# Patient Record
Sex: FEMALE | Race: WHITE | NOT HISPANIC OR LATINO | Employment: FULL TIME | URBAN - METROPOLITAN AREA
[De-identification: names, ages, dates, MRNs, and addresses within clinical notes are randomized per-mention and may not be internally consistent; named-entity substitution may affect disease eponyms.]

---

## 2023-11-02 DIAGNOSIS — D33.3 VESTIBULAR SCHWANNOMA (MULTI): Primary | ICD-10-CM

## 2024-01-11 ENCOUNTER — OFFICE VISIT (OUTPATIENT)
Dept: OTOLARYNGOLOGY | Facility: CLINIC | Age: 64
End: 2024-01-11
Payer: COMMERCIAL

## 2024-01-11 ENCOUNTER — CLINICAL SUPPORT (OUTPATIENT)
Dept: AUDIOLOGY | Facility: CLINIC | Age: 64
End: 2024-01-11
Payer: COMMERCIAL

## 2024-01-11 VITALS — BODY MASS INDEX: 24.22 KG/M2 | WEIGHT: 132.4 LBS

## 2024-01-11 DIAGNOSIS — D33.3 VESTIBULAR SCHWANNOMA (MULTI): ICD-10-CM

## 2024-01-11 DIAGNOSIS — H90.3 SENSORINEURAL HEARING LOSS, BILATERAL: Primary | ICD-10-CM

## 2024-01-11 DIAGNOSIS — H93.13 BILATERAL TINNITUS: ICD-10-CM

## 2024-01-11 DIAGNOSIS — H90.3 BILATERAL SENSORINEURAL HEARING LOSS: Primary | ICD-10-CM

## 2024-01-11 PROCEDURE — 99204 OFFICE O/P NEW MOD 45 MIN: CPT | Performed by: OTOLARYNGOLOGY

## 2024-01-11 PROCEDURE — 1036F TOBACCO NON-USER: CPT | Performed by: OTOLARYNGOLOGY

## 2024-01-11 PROCEDURE — 92557 COMPREHENSIVE HEARING TEST: CPT | Performed by: AUDIOLOGIST

## 2024-01-11 PROCEDURE — 92567 TYMPANOMETRY: CPT | Performed by: AUDIOLOGIST

## 2024-01-11 RX ORDER — METOPROLOL SUCCINATE 25 MG/1
12.5 TABLET, EXTENDED RELEASE ORAL
COMMUNITY
Start: 2023-11-04

## 2024-01-11 RX ORDER — ESTRADIOL 1 MG/1
1 TABLET ORAL DAILY
COMMUNITY
Start: 2023-12-19

## 2024-01-11 RX ORDER — LISINOPRIL 2.5 MG/1
2.5 TABLET ORAL
COMMUNITY
Start: 2023-11-04

## 2024-01-11 RX ORDER — METHOCARBAMOL 750 MG/1
750 TABLET, FILM COATED ORAL 4 TIMES DAILY PRN
COMMUNITY

## 2024-01-11 ASSESSMENT — PATIENT HEALTH QUESTIONNAIRE - PHQ9
2. FEELING DOWN, DEPRESSED OR HOPELESS: NOT AT ALL
1. LITTLE INTEREST OR PLEASURE IN DOING THINGS: NOT AT ALL
SUM OF ALL RESPONSES TO PHQ9 QUESTIONS 1 AND 2: 0

## 2024-01-11 NOTE — PROGRESS NOTES
Jaquelin Garcia is a 63 y.o. female is referred by Dr Hernandez for evaluation and management of a left  internal auditory canal and subjective bilateral tinnitus. She is accompanied by her .    When asked about ear pain, hearing loss, discharge from ear, tinnitus, imbalance or vertigo, the patient admits to progressively worsening tinnitus which is worse in the left than right side. She notes it disrupts her sleep and sometimes wears head phones with no relief.  When asked about a significant past otological history including history of prior ear surgery, noise exposure, exposure to ototoxic drugs or agents, and/or family history of hearing loss, the patient admits to none.    The patient's current medications, active allergies and list of medical problems were reviewed in the EHR and confirmed electronically there are as follows;  Allergies:   Not on File  Current list of medications:   No current outpatient medications on file.     No current facility-administered medications for this visit.     Problem list:  There is no problem list on file for this patient.    Medical history:  No past medical history on file.  Surgical history:  Past Surgical History:   Procedure Laterality Date    OTHER SURGICAL HISTORY  2022    Tonsillectomy    OTHER SURGICAL HISTORY  2022    Hysterectomy    OTHER SURGICAL HISTORY  2022     section    OTHER SURGICAL HISTORY  2022    Lumpectomy    OTHER SURGICAL HISTORY  2022    Lung surgery    OTHER SURGICAL HISTORY  2022    Oral surgery     Family history:  No family history on file.  Social history:       Physical Examination:  CONSTITUTIONAL:  No acute distress  VOICE:  No hoarseness or other abnormality  RESPIRATION:  Breathing comfortably, no stridor  CV:  No clubbing/cyanosis/edema in hands  EYES:  EOM intact, sclera clear  NEURO:  Alert and oriented times 3, Cranial nerves II-XII grossly intact and symmetric bilaterally  HEAD AND FACE:   Mal occlusion and bad dentition. No significant tenderness  RIGHT EAR:  Normal external ear and post auricular area, no visible lesions, external auditory canal patent, tympanic membrane intact, no retraction, no signs of mass, effusion, or infection within the middle ear  LEFT EAR: Normal external ear and post auricular area, no visible lesions, external auditory canal patent, tympanic membrane intact, no retraction, no signs of mass, effusion, or infection within the middle ear  NOSE:  External appearance normal without an obvious deformity.  PHARYNGEAL WALLS: Normal lining without obvious masses.  NECK/LYMPH:  No LAD, no thyroid masses, trachea midline  SKIN:  Neck and facial skin is without scar or injury  PSYCH:  Alert and oriented with appropriate mood and affect    Diagnostic testing:  An MRI available for review showed a 2 mm enhancing nodule of the lateral aspect of the let internal artery canal possibly consistent with a small vestibular schwannoma.    The audiogram done today showed normal hearing sensitivity sloping to mild high frequency symmetric Sensorineural hearing loss (SNHL). Speech discrimination is excellent.    I personally reviewed the available patient's external record and independently reviewed their audiometric testing [and] radiographic imaging through the appropriate viewing software as detailed in my note and agree with the detailed report.    Impression:  Bilateral Subjective Tinnitus  left  IAC/CPA lesion  bilateral  Sensorineural hearing loss  Presbycusis    Recommendation:  The patient's condition was reviewed and explained. We reviewed tinnitus mask and distraction technique. No need for hearing amplification. She is currently following up with Dr Hernandez for her IAC lesion and has a scheduled follow-up MRI. The most recent MRI showed a stable size and no growth. I suggested TMJ therapy for her tinnitus but she felt it might not be necessary at this point. All questions were  answered to the patient's satisfaction.    I discussed with the patient the complexity of my medical decision making including the treatment and testing rational, indications of their elective procedure and possible adverse effects and/or complications. Based on the provided documentation and my professional assessment of this patient's newly diagnosed conditions, the complexity of evaluation and treatment is mild.        Scribe Attestation  By signing my name below, Yessi POSEY Scrtalia   attest that this documentation has been prepared under the direction and in the presence of Nidhi Martinez MD.     Never smoker

## 2024-01-11 NOTE — PATIENT INSTRUCTIONS
Patient Education:  Vestibular Schwannoma   Vestibular schwannoma, also called acoustic neuroma, is a benign tumor involving the hearing and balance nerve at the base of the brain. It occurs in is about 1 per 100,000 people per year. Acoustic neuromas do not spread throughout the body, but can cause significant disability, including hearing loss, dizziness, facial numbness, and, rarely in this era, even death, by local growth into nearby important brain structures.     Early symptoms of an acoustic neuroma include hearing loss, distorted sound perception, tinnitus, dizziness, and disequilibrium. Later symptoms may include headache, unsteadiness, facial pain, tingling, or numbness, facial tics or weakness, double vision, and difficulty in swallowing or talking.   There are a number of tests that can be utilized to diagnose acoustic neuromas, the utility of which should be based upon a complete history and physical by an experienced physician. The definitive diagnostic test is an MRI with gadolinium enhancement. However, this test should only be obtained following appropriate clinical evaluation, and hearing, and balance testing where indicated.     Management options include observation with serial MRIs, partial or total surgical removal, and radiation therapy. The treatment is tailored to the individual, and depends upon the patient's symptoms, hearing level, health status, age, and the growth rate of the tumor, and the wishes of the patient. Surgery for acoustic neuromas requiring intervention will involve one of three basic approaches: (1) through the temple or middle fossa approach, (2) through the ear or translabyrinthine, and (3) through the back of the head or retrosigmoid approach. The approach used depends upon the size and location of the tumor, the status of the preoperative hearing, and the experience and preference of the surgical team. The optimal treatment goal is removal of the tumor while maintaining  existing hearing and facial function. In many cases, hearing in the affected ear cannot be preserved. Since acoustic neuromas are usually slow growing, partial tumor removal may be elected by the surgeon to reduce surgical time and preserve facial function. For patients with a growing tumor who are not surgical candidates for whatever reason, targeted radiation is a good alternative.     Possible complications that may require further medical and/or surgical rehabilitation include: hearing loss, dizziness, facial weakness or paralysis, prolonged headaches, fluid leak from around the brain, and tumor recurrence. Many of these complications can occur after either microsurgery or radiation therapy.

## 2024-01-11 NOTE — PROGRESS NOTES
"AUDIOMETRIC EVALUATION       Name:  Jaquelin Garcia  :  1960  Age:  63 y.o.  Date of Evaluation:  2024     HISTORY  Jaquelin Garcia was seen today for a hearing evaluation due to left vestibular schwannoma. She reports episodes of dizziness, spinning every 9 months. Reports bilateral tinnitus but louder in the left ear. Denies hearing loss, pain or drainage.    PROCEDURE:  Otoscopic Evaluation:    RIGHT: Clear ear canal and tympanic membrane visualized.  LEFT:  Clear ear canal and tympanic membrane visualized.    Immittance:    Tympanometry (226 Hz probe tone) and Stapedial Acoustic Reflexes Thresholds (ART)(Probe ear):  RIGHT: Normal middle ear pressure, mobility, and ear canal volume. Ipsilateral ART present 500-2000 Hz.  LEFT: Normal middle ear pressure, mobility, and ear canal volume. Ipsilateral ART absent 500-2000 Hz.    Pure Tone and Speech Audiometry:    Test Technique: Pure Tone Audiometry via TDH headphones  Test Reliability: good    RIGHT: Normal hearing through 4000 Hz sloping to mild  sensorineural hearing loss through 8000 Hz.. Word Recognition score was excellent using recorded material (NU-6 10-word list ordered by difficulty).   LEFT:  Mild hearing loss through 4000 Hz sloping to moderate  sensorineural hearing loss through 8000 Hz.. Word Recognition score was excellent using recorded material (NU-6 10-word list ordered by difficulty).     EVALUATION  See scanned Audiogram in \"Media\".    IMPRESSIONS:  Today's test results indicate normal hearing through 4000 Hz sloping to a high frequency mild sensorineural hearing loss, bilaterally.    RECOMMENDATIONS:  Continue medical follow-up with physician.  Return for audiologic assessment in conjunction with otologic care or annually.     PATIENT EDUCATION:   Discussed results and recommendations with Jaquelin Garcia.  Questions were addressed and the patient was encouraged to contact our department ([phone]) should concerns arise.    Lelo SANDOVAL" MARK Tolentino, CCC-A  Senior Clinical Audiologist    TIME: 130-200

## 2024-07-18 ENCOUNTER — TELEPHONE (OUTPATIENT)
Dept: NEUROSURGERY | Facility: HOSPITAL | Age: 64
End: 2024-07-18
Payer: COMMERCIAL

## 2024-07-18 NOTE — TELEPHONE ENCOUNTER
Patient plans to wait until after November 2024 when she is 64 and gets medicare to schedule her follow-up imaging.    Taniya Martin R.N.